# Patient Record
Sex: FEMALE | Race: WHITE
[De-identification: names, ages, dates, MRNs, and addresses within clinical notes are randomized per-mention and may not be internally consistent; named-entity substitution may affect disease eponyms.]

---

## 2021-04-21 ENCOUNTER — HOSPITAL ENCOUNTER (EMERGENCY)
Dept: HOSPITAL 47 - EC | Age: 56
Discharge: HOME | End: 2021-04-21
Payer: COMMERCIAL

## 2021-04-21 VITALS — HEART RATE: 78 BPM | TEMPERATURE: 98 F | DIASTOLIC BLOOD PRESSURE: 74 MMHG | SYSTOLIC BLOOD PRESSURE: 135 MMHG

## 2021-04-21 VITALS — RESPIRATION RATE: 18 BRPM

## 2021-04-21 DIAGNOSIS — J32.9: Primary | ICD-10-CM

## 2021-04-21 DIAGNOSIS — Z20.822: ICD-10-CM

## 2021-04-21 DIAGNOSIS — Z87.891: ICD-10-CM

## 2021-04-21 LAB
ALBUMIN SERPL-MCNC: 4.4 G/DL (ref 3.5–5)
ALP SERPL-CCNC: 63 U/L (ref 38–126)
ALT SERPL-CCNC: 41 U/L (ref 4–34)
ANION GAP SERPL CALC-SCNC: 9 MMOL/L
AST SERPL-CCNC: 37 U/L (ref 14–36)
BASOPHILS # BLD AUTO: 0.1 K/UL (ref 0–0.2)
BASOPHILS NFR BLD AUTO: 0 %
BUN SERPL-SCNC: 15 MG/DL (ref 7–17)
CALCIUM SPEC-MCNC: 9.8 MG/DL (ref 8.4–10.2)
CHLORIDE SERPL-SCNC: 106 MMOL/L (ref 98–107)
CO2 SERPL-SCNC: 24 MMOL/L (ref 22–30)
EOSINOPHIL # BLD AUTO: 0.4 K/UL (ref 0–0.7)
EOSINOPHIL NFR BLD AUTO: 2 %
ERYTHROCYTE [DISTWIDTH] IN BLOOD BY AUTOMATED COUNT: 4.99 M/UL (ref 3.8–5.4)
ERYTHROCYTE [DISTWIDTH] IN BLOOD: 13.8 % (ref 11.5–15.5)
GLUCOSE SERPL-MCNC: 98 MG/DL (ref 74–99)
HCT VFR BLD AUTO: 41.8 % (ref 34–46)
HGB BLD-MCNC: 14.2 GM/DL (ref 11.4–16)
LYMPHOCYTES # SPEC AUTO: 0.5 K/UL (ref 1–4.8)
LYMPHOCYTES NFR SPEC AUTO: 3 %
MCH RBC QN AUTO: 28.4 PG (ref 25–35)
MCHC RBC AUTO-ENTMCNC: 33.9 G/DL (ref 31–37)
MCV RBC AUTO: 83.9 FL (ref 80–100)
MONOCYTES # BLD AUTO: 0.3 K/UL (ref 0–1)
MONOCYTES NFR BLD AUTO: 2 %
NEUTROPHILS # BLD AUTO: 18.6 K/UL (ref 1.3–7.7)
NEUTROPHILS NFR BLD AUTO: 93 %
PLATELET # BLD AUTO: 201 K/UL (ref 150–450)
POTASSIUM SERPL-SCNC: 4.6 MMOL/L (ref 3.5–5.1)
PROT SERPL-MCNC: 7.7 G/DL (ref 6.3–8.2)
SODIUM SERPL-SCNC: 139 MMOL/L (ref 137–145)
WBC # BLD AUTO: 19.9 K/UL (ref 3.8–10.6)

## 2021-04-21 PROCEDURE — 99285 EMERGENCY DEPT VISIT HI MDM: CPT

## 2021-04-21 PROCEDURE — 80053 COMPREHEN METABOLIC PANEL: CPT

## 2021-04-21 PROCEDURE — 85025 COMPLETE CBC W/AUTO DIFF WBC: CPT

## 2021-04-21 PROCEDURE — 87635 SARS-COV-2 COVID-19 AMP PRB: CPT

## 2021-04-21 PROCEDURE — 36415 COLL VENOUS BLD VENIPUNCTURE: CPT

## 2021-04-21 PROCEDURE — 93005 ELECTROCARDIOGRAM TRACING: CPT

## 2021-04-21 PROCEDURE — 85379 FIBRIN DEGRADATION QUANT: CPT

## 2021-04-21 NOTE — ED
General Adult HPI





- General


Chief complaint: Shortness of Breath


Stated complaint: sent from [facility],low oxygen


Time Seen by Provider: 04/21/21 13:45


Source: patient, RN notes reviewed, old records reviewed


Mode of arrival: ambulatory


Limitations: no limitations





- History of Present Illness


Initial comments: 





This is a 55-year-old female presents to the emergency department complaining 

that he is feeling achy since yesterday.  Patient states she also had a slight 

cough and had a fever.  Patient states she got her first shot of the vaccination

on April 5.  Patient denies any diarrhea.  Patient denies abdominal pain patient

denies any nausea vomiting.  Patient denies any chest pain or palpitations.  

[facility] because a told her they were 

unable to obtain temperature the states she was tachycardic face that her pulse 

ox was low and they said she had pneumonia possibly.  I looked at the paperwork 

that they had said the patient had a pulse ox that was 97%.  The x-ray results 

were read by the radiologist as no acute findings.  Patient did have a 

temperature in our emergency department that I took was 100.6.  An patient was 

98% on room air in our department.  And patient remained mildly tachycardic but 

also had a fever.  Patient does complain of some extra sinus tenderness.  

Patient states she's been treated for sinusitis multiple times over the years.





- Related Data


                                Home Medications











 Medication  Instructions  Recorded  Confirmed


 


Cetirizine HCl [Zyrtec] 10 mg PO DAILY 04/21/21 04/21/21


 


Ibuprofen [Motrin Ib] 400 mg PO Q8H PRN 04/21/21 04/21/21


 


Magnesium Oxide [Mag-Oxide] 200 mg PO DAILY 04/21/21 04/21/21


 


Montelukast [Singulair] 10 mg PO DAILY 04/21/21 04/21/21








                                  Previous Rx's











 Medication  Instructions  Recorded


 


Amoxicillin/Potassium Clav 1 each PO Q12HR #20 tab 04/21/21





[Augmentin 875-125 Tablet]  











                                    Allergies











Allergy/AdvReac Type Severity Reaction Status Date / Time


 


corn Allergy  Unknown Verified 04/21/21 15:53


 


egg Allergy  Unknown Verified 04/21/21 15:53


 


soy Allergy  Unknown Verified 04/21/21 15:53














Review of Systems


ROS Statement: 


Those systems with pertinent positive or pertinent negative responses have been 

documented in the HPI.





ROS Other: All systems not noted in ROS Statement are negative.





Past Medical History


Additional Past Medical History / Comment(s): endometriosis


History of Any Multi-Drug Resistant Organisms: None Reported


Past Surgical History: Tonsillectomy


Additional Past Surgical History / Comment(s): deviated septum, IVF


Past Psychological History: No Psychological Hx Reported, Depression


Smoking Status: Former smoker


Past Alcohol Use History: Occasional


Past Drug Use History: None Reported





General Exam





- General Exam Comments


Initial Comments: 





GENERAL:


Patient is well-developed and well-nourished.  Patient is nontoxic and well-

hydrated and is in no acute distress.





ENT:


Neck is soft and supple.  No significant lymphadenopathy is noted.  Oropharynx 

is clear.  Moist mucous membranes.  Neck has full range of motion without 

eliciting any pain.  Maxillary sinus tenderness





EYES:


The sclera were anicteric and conjunctiva were pink and moist.  Extraocular 

movements were intact and pupils were equal round and reactive to light.  

Eyelids were unremarkable.





PULMONARY:


Unlabored respirations.  Good breath sounds bilaterally.  No audible rales 

rhonchi or wheezing was noted.





CARDIOVASCULAR:


Patient is mildly tachycardic at 110 bpm





ABDOMEN:


Soft and nontender with normal bowel sounds.





SKIN:


Skin is clear with no lesions or rashes and otherwise unremarkable.





NEUROLOGIC:


Patient is alert and oriented x3.  Cranial nerves II through XII are grossly 

intact.  Motor and sensory are also intact.  Normal speech, volume and content. 

Symmetrical smile.  





MUSCULOSKELETAL:


Normal extremities with adequate strength and full range of motion.  No lower 

extremity swelling or edema.  No calf tenderness.





LYMPHATICS:


No significant lymphadenopathy is noted





PSYCHIATRIC:


Normal psychiatric evaluation.  


Limitations: no limitations





Course


                                   Vital Signs











  04/21/21 04/21/21 04/21/21





  13:48 14:50 17:11


 


Temperature 98.8 F 100.6 F H 98.2 F


 


Pulse Rate 116 H  93


 


Respiratory 19  18





Rate   


 


Blood Pressure 120/67  113/63


 


O2 Sat by Pulse 98  97





Oximetry   














Medical Decision Making





- Medical Decision Making





EKG shows normal sinus rhythm at 95 bpm UT interval 288 QRS is 64 QT interval is

356 QTC is 447.  Patient's EKG shows no ST segment elevation or depression.





I reviewed the patient's outpatient chest x-ray was normal.





- Lab Data


Result diagrams: 


                                 04/21/21 16:13





                                 04/21/21 16:13


                                   Lab Results











  04/21/21 04/21/21 04/21/21 Range/Units





  14:20 16:13 16:13 


 


WBC   19.9 H   (3.8-10.6)  k/uL


 


RBC   4.99   (3.80-5.40)  m/uL


 


Hgb   14.2   (11.4-16.0)  gm/dL


 


Hct   41.8   (34.0-46.0)  %


 


MCV   83.9   (80.0-100.0)  fL


 


MCH   28.4   (25.0-35.0)  pg


 


MCHC   33.9   (31.0-37.0)  g/dL


 


RDW   13.8   (11.5-15.5)  %


 


Plt Count   201   (150-450)  k/uL


 


MPV   9.3   


 


Neutrophils %   93   %


 


Lymphocytes %   3   %


 


Monocytes %   2   %


 


Eosinophils %   2   %


 


Basophils %   0   %


 


Neutrophils #   18.6 H   (1.3-7.7)  k/uL


 


Lymphocytes #   0.5 L   (1.0-4.8)  k/uL


 


Monocytes #   0.3   (0-1.0)  k/uL


 


Eosinophils #   0.4   (0-0.7)  k/uL


 


Basophils #   0.1   (0-0.2)  k/uL


 


D-Dimer    0.28  (<0.60)  mg/L FEU


 


Sodium     (137-145)  mmol/L


 


Potassium     (3.5-5.1)  mmol/L


 


Chloride     ()  mmol/L


 


Carbon Dioxide     (22-30)  mmol/L


 


Anion Gap     mmol/L


 


BUN     (7-17)  mg/dL


 


Creatinine     (0.52-1.04)  mg/dL


 


Est GFR (CKD-EPI)AfAm     (>60 ml/min/1.73 sqM)  


 


Est GFR (CKD-EPI)NonAf     (>60 ml/min/1.73 sqM)  


 


Glucose     (74-99)  mg/dL


 


Calcium     (8.4-10.2)  mg/dL


 


Total Bilirubin     (0.2-1.3)  mg/dL


 


AST     (14-36)  U/L


 


ALT     (4-34)  U/L


 


Alkaline Phosphatase     ()  U/L


 


Total Protein     (6.3-8.2)  g/dL


 


Albumin     (3.5-5.0)  g/dL


 


Coronavirus (PCR)  Not Detected    (Not Detectd)  














  04/21/21 Range/Units





  16:13 


 


WBC   (3.8-10.6)  k/uL


 


RBC   (3.80-5.40)  m/uL


 


Hgb   (11.4-16.0)  gm/dL


 


Hct   (34.0-46.0)  %


 


MCV   (80.0-100.0)  fL


 


MCH   (25.0-35.0)  pg


 


MCHC   (31.0-37.0)  g/dL


 


RDW   (11.5-15.5)  %


 


Plt Count   (150-450)  k/uL


 


MPV   


 


Neutrophils %   %


 


Lymphocytes %   %


 


Monocytes %   %


 


Eosinophils %   %


 


Basophils %   %


 


Neutrophils #   (1.3-7.7)  k/uL


 


Lymphocytes #   (1.0-4.8)  k/uL


 


Monocytes #   (0-1.0)  k/uL


 


Eosinophils #   (0-0.7)  k/uL


 


Basophils #   (0-0.2)  k/uL


 


D-Dimer   (<0.60)  mg/L FEU


 


Sodium  139  (137-145)  mmol/L


 


Potassium  4.6  (3.5-5.1)  mmol/L


 


Chloride  106  ()  mmol/L


 


Carbon Dioxide  24  (22-30)  mmol/L


 


Anion Gap  9  mmol/L


 


BUN  15  (7-17)  mg/dL


 


Creatinine  0.87  (0.52-1.04)  mg/dL


 


Est GFR (CKD-EPI)AfAm  86  (>60 ml/min/1.73 sqM)  


 


Est GFR (CKD-EPI)NonAf  74  (>60 ml/min/1.73 sqM)  


 


Glucose  98  (74-99)  mg/dL


 


Calcium  9.8  (8.4-10.2)  mg/dL


 


Total Bilirubin  0.6  (0.2-1.3)  mg/dL


 


AST  37 H  (14-36)  U/L


 


ALT  41 H  (4-34)  U/L


 


Alkaline Phosphatase  63  ()  U/L


 


Total Protein  7.7  (6.3-8.2)  g/dL


 


Albumin  4.4  (3.5-5.0)  g/dL


 


Coronavirus (PCR)   (Not Detectd)  














Disposition


Clinical Impression: 


 Sinusitis





Disposition: HOME SELF-CARE


Condition: Good


Instructions (If sedation given, give patient instructions):  Sinusitis (ED)


Prescriptions: 


Amoxicillin/Potassium Clav [Augmentin 875-125 Tablet] 1 each PO Q12HR #20 tab


Is patient prescribed a controlled substance at d/c from ED?: No


Referrals: 


Chanel Cardenas MD [Primary Care Provider] - 1-2 days


Time of Disposition: 17:15